# Patient Record
Sex: FEMALE | Race: WHITE | NOT HISPANIC OR LATINO
[De-identification: names, ages, dates, MRNs, and addresses within clinical notes are randomized per-mention and may not be internally consistent; named-entity substitution may affect disease eponyms.]

---

## 2022-07-07 ENCOUNTER — APPOINTMENT (OUTPATIENT)
Dept: FAMILY MEDICINE | Facility: CLINIC | Age: 22
End: 2022-07-07

## 2022-07-07 VITALS
OXYGEN SATURATION: 99 % | DIASTOLIC BLOOD PRESSURE: 69 MMHG | HEART RATE: 85 BPM | HEIGHT: 67 IN | SYSTOLIC BLOOD PRESSURE: 115 MMHG | RESPIRATION RATE: 16 BRPM | TEMPERATURE: 97.3 F

## 2022-07-07 VITALS — WEIGHT: 165 LBS | BODY MASS INDEX: 25.84 KG/M2

## 2022-07-07 DIAGNOSIS — Z23 ENCOUNTER FOR IMMUNIZATION: ICD-10-CM

## 2022-07-07 DIAGNOSIS — F17.290 NICOTINE DEPENDENCE, OTHER TOBACCO PRODUCT, UNCOMPLICATED: ICD-10-CM

## 2022-07-07 DIAGNOSIS — Z83.3 FAMILY HISTORY OF DIABETES MELLITUS: ICD-10-CM

## 2022-07-07 DIAGNOSIS — Z72.89 OTHER PROBLEMS RELATED TO LIFESTYLE: ICD-10-CM

## 2022-07-07 DIAGNOSIS — Z00.00 ENCOUNTER FOR GENERAL ADULT MEDICAL EXAMINATION W/OUT ABNORMAL FINDINGS: ICD-10-CM

## 2022-07-07 DIAGNOSIS — Z33.2 ENCOUNTER FOR ELECTIVE TERMINATION OF PREGNANCY: ICD-10-CM

## 2022-07-07 DIAGNOSIS — Z84.2 FAMILY HISTORY OF OTHER DISEASES OF THE GENITOURINARY SYSTEM: ICD-10-CM

## 2022-07-07 DIAGNOSIS — B17.8 INFECTIOUS MONONUCLEOSIS, UNSPECIFIED WITH OTHER COMPLICATION: ICD-10-CM

## 2022-07-07 DIAGNOSIS — Z82.0 FAMILY HISTORY OF EPILEPSY AND OTHER DISEASES OF THE NERVOUS SYSTEM: ICD-10-CM

## 2022-07-07 DIAGNOSIS — Z82.49 FAMILY HISTORY OF ISCHEMIC HEART DISEASE AND OTHER DISEASES OF THE CIRCULATORY SYSTEM: ICD-10-CM

## 2022-07-07 DIAGNOSIS — Z11.3 ENCOUNTER FOR SCREENING FOR INFECTIONS WITH A PREDOMINANTLY SEXUAL MODE OF TRANSMISSION: ICD-10-CM

## 2022-07-07 DIAGNOSIS — Z84.89 FAMILY HISTORY OF OTHER SPECIFIED CONDITIONS: ICD-10-CM

## 2022-07-07 DIAGNOSIS — B27.99 INFECTIOUS MONONUCLEOSIS, UNSPECIFIED WITH OTHER COMPLICATION: ICD-10-CM

## 2022-07-07 PROCEDURE — 90715 TDAP VACCINE 7 YRS/> IM: CPT

## 2022-07-07 PROCEDURE — 99385 PREV VISIT NEW AGE 18-39: CPT | Mod: 25

## 2022-07-07 PROCEDURE — 90471 IMMUNIZATION ADMIN: CPT

## 2022-07-07 RX ORDER — ONDANSETRON 4 MG/1
4 TABLET ORAL
Qty: 8 | Refills: 0 | Status: DISCONTINUED | COMMUNITY
Start: 2022-05-17

## 2022-07-07 RX ORDER — IBUPROFEN 800 MG/1
800 TABLET, FILM COATED ORAL
Qty: 15 | Refills: 0 | Status: DISCONTINUED | COMMUNITY
Start: 2022-05-17

## 2022-07-07 NOTE — ASSESSMENT
[FreeTextEntry1] : Blood work requisition provided. Will follow up with results.\par Tdap administered today\par Declines COVID vaccination \par LS Spine XR \par Return for cervical cancer screening, Discuss gardasil at that time, records have no indication that it has been administered.

## 2022-07-07 NOTE — PHYSICAL EXAM
[No Joint Swelling] : no joint swelling [Grossly Normal Strength/Tone] : grossly normal strength/tone [Coordination Grossly Intact] : coordination grossly intact [No Focal Deficits] : no focal deficits [Normal] : affect was normal and insight and judgment were intact

## 2022-07-07 NOTE — HEALTH RISK ASSESSMENT
[Current] : Current [None] : None [Good] : ~his/her~  mood as  good [2 - 4 times a month (2 pts)] : 2-4 times a month (2 points) [1 or 2 (0 pts)] : 1 or 2 (0 points) [Monthly (2 pts)] : Monthly (2 points) [Yes] : In the past 12 months have you used drugs other than those required for medical reasons? Yes [0] : 2) Feeling down, depressed, or hopeless: Not at all (0) [PHQ-2 Negative - No further assessment needed] : PHQ-2 Negative - No further assessment needed [HIV Test offered] : HIV Test offered [Hepatitis C test offered] : Hepatitis C test offered [With Family] : lives with family [Employed] : employed [Single] : single [Sexually Active] : sexually active [High Risk Behavior] : high risk behavior [Fully functional (bathing, dressing, toileting, transferring, walking, feeding)] : Fully functional (bathing, dressing, toileting, transferring, walking, feeding) [Fully functional (using the telephone, shopping, preparing meals, housekeeping, doing laundry, using] : Fully functional and needs no help or supervision to perform IADLs (using the telephone, shopping, preparing meals, housekeeping, doing laundry, using transportation, managing medications and managing finances) [de-identified] : uses Juul vape [Audit-CScore] : 4 [de-identified] : marijuana [[id number]] : 0 [Reports changes in hearing] : Reports no changes in hearing [Reports changes in vision] : Reports no changes in vision [Reports normal functional visual acuity (ie: able to read med bottle)] : Reports poor functional visual acuity.  [de-identified] : wears contacts

## 2022-07-07 NOTE — COUNSELING
[Yes] : Risk of tobacco use and health benefits of smoking cessation discussed: Yes [Encouraged to pick a quit date and identify support needed to quit] : Encouraged to pick a quit date and identify support needed to quit [AUDIT-C Screening administered and reviewed] : AUDIT-C Screening administered and reviewed [Hazards of at-risk alcohol use discussed] : Hazards of at-risk alcohol use discussed [FreeTextEntry2] : Never drives while intoxicated  [Good understanding] : Patient has a good understanding of disease, goals and obesity follow-up plan

## 2022-07-07 NOTE — REVIEW OF SYSTEMS
[Vision Problems] : vision problems [Back Pain] : back pain [Negative] : Psychiatric [FreeTextEntry3] : contact lenses [de-identified] : headaches with periods

## 2023-01-13 ENCOUNTER — APPOINTMENT (OUTPATIENT)
Dept: FAMILY MEDICINE | Facility: CLINIC | Age: 23
End: 2023-01-13
Payer: COMMERCIAL

## 2023-01-13 VITALS
TEMPERATURE: 98.6 F | OXYGEN SATURATION: 99 % | WEIGHT: 159 LBS | DIASTOLIC BLOOD PRESSURE: 79 MMHG | HEART RATE: 87 BPM | SYSTOLIC BLOOD PRESSURE: 125 MMHG | RESPIRATION RATE: 16 BRPM

## 2023-01-13 DIAGNOSIS — F41.1 GENERALIZED ANXIETY DISORDER: ICD-10-CM

## 2023-01-13 PROCEDURE — 99214 OFFICE O/P EST MOD 30 MIN: CPT | Mod: 25

## 2023-01-13 PROCEDURE — 81003 URINALYSIS AUTO W/O SCOPE: CPT | Mod: NC,QW

## 2023-01-13 RX ORDER — NORETHINDRONE ACETATE AND ETHINYL ESTRADIOL 1MG-20(21)
1-20 KIT ORAL
Qty: 28 | Refills: 0 | Status: DISCONTINUED | COMMUNITY
Start: 2022-05-31 | End: 2023-01-13

## 2023-01-13 NOTE — HISTORY OF PRESENT ILLNESS
[FreeTextEntry8] : 21 yo female with history of mononucleosis hepatitis (2019) presents with concerns of pain with urination. She started feeling symptoms about one week ago. Has had intermittent burning and pelvic pressure. She was taking an OTC medication but took pills sporadically. Yesterday she felt like she constantly had to urinate. Denies fever, chills, nausea, vomiting. She is currently at the end of her period, LMP 1/5/2023. Currently sexually active with one male partner with consistent condom use. Did not start birth control pills.\par No recent antibiotic use, no allergies to meds. Has some cramping and back pain like she usually has with periods. \par \par Mildred also notes ongoing anxiety that she has been experiencing primarily since starting work. She has anxiety attacks in the mornings before going to work. She worries about getting to work on time and driving. Sometimes this anxiety will lead to wretching, but noting come out. She tries to calm herself down by breathing. She does not experience these symptoms on the weekends. She would be interested in speaking with a therapist. She is not interested in medication at this time.

## 2023-01-13 NOTE — PHYSICAL EXAM
[Normal] : normal rate, regular rhythm, normal S1 and S2 and no murmur heard [de-identified] : supra pubic tenderness with palpation

## 2023-01-17 LAB
APPEARANCE: ABNORMAL
BACTERIA UR CULT: ABNORMAL
BACTERIA: NEGATIVE
BILIRUBIN URINE: NEGATIVE
BLOOD URINE: ABNORMAL
COLOR: YELLOW
GLUCOSE QUALITATIVE U: NEGATIVE
HYALINE CASTS: 1 /LPF
KETONES URINE: NEGATIVE
LEUKOCYTE ESTERASE URINE: ABNORMAL
MICROSCOPIC-UA: NORMAL
NITRITE URINE: POSITIVE
PH URINE: 6
PROTEIN URINE: ABNORMAL
RED BLOOD CELLS URINE: 12 /HPF
SPECIFIC GRAVITY URINE: 1.01
SQUAMOUS EPITHELIAL CELLS: 1 /HPF
UROBILINOGEN URINE: NORMAL
WHITE BLOOD CELLS URINE: 218 /HPF

## 2023-01-23 ENCOUNTER — NON-APPOINTMENT (OUTPATIENT)
Age: 23
End: 2023-01-23

## 2023-01-23 DIAGNOSIS — N30.00 ACUTE CYSTITIS W/OUT HEMATURIA: ICD-10-CM

## 2023-01-23 RX ORDER — NITROFURANTOIN (MONOHYDRATE/MACROCRYSTALS) 25; 75 MG/1; MG/1
100 CAPSULE ORAL
Qty: 10 | Refills: 0 | Status: DISCONTINUED | COMMUNITY
Start: 2023-01-13 | End: 2023-01-23

## 2023-02-23 ENCOUNTER — APPOINTMENT (OUTPATIENT)
Dept: FAMILY MEDICINE | Facility: CLINIC | Age: 23
End: 2023-02-23
Payer: COMMERCIAL

## 2023-02-23 DIAGNOSIS — R30.0 DYSURIA: ICD-10-CM

## 2023-02-23 PROCEDURE — 99441: CPT

## 2023-02-23 RX ORDER — SULFAMETHOXAZOLE AND TRIMETHOPRIM 800; 160 MG/1; MG/1
800-160 TABLET ORAL
Qty: 10 | Refills: 0 | Status: COMPLETED | COMMUNITY
Start: 2023-01-23 | End: 2023-02-28

## 2023-02-23 RX ORDER — PHENAZOPYRIDINE HYDROCHLORIDE 100 MG/1
100 TABLET ORAL TWICE DAILY
Qty: 10 | Refills: 3 | Status: ACTIVE | COMMUNITY
Start: 2023-02-23 | End: 1900-01-01

## 2023-02-24 ENCOUNTER — NON-APPOINTMENT (OUTPATIENT)
Age: 23
End: 2023-02-24

## 2023-02-24 LAB
C TRACH RRNA SPEC QL NAA+PROBE: NOT DETECTED
N GONORRHOEA RRNA SPEC QL NAA+PROBE: NOT DETECTED
SOURCE AMPLIFICATION: NORMAL

## 2023-02-27 ENCOUNTER — NON-APPOINTMENT (OUTPATIENT)
Age: 23
End: 2023-02-27

## 2023-02-27 LAB
APPEARANCE: ABNORMAL
BACTERIA UR CULT: ABNORMAL
BACTERIA: ABNORMAL
BILIRUBIN URINE: ABNORMAL
BLOOD URINE: ABNORMAL
COLOR: ABNORMAL
GLUCOSE QUALITATIVE U: ABNORMAL
GRANULAR CASTS: 0 /LPF
HYALINE CASTS: 0 /LPF
KETONES URINE: ABNORMAL
LEUKOCYTE ESTERASE URINE: ABNORMAL
MICROSCOPIC-UA: NORMAL
NITRITE URINE: ABNORMAL
PH URINE: ABNORMAL
PROTEIN URINE: ABNORMAL
RED BLOOD CELLS URINE: 25 /HPF
SPECIFIC GRAVITY URINE: ABNORMAL
SQUAMOUS EPITHELIAL CELLS: 0 /HPF
UROBILINOGEN URINE: ABNORMAL
WHITE BLOOD CELLS URINE: 200 /HPF

## 2023-04-25 ENCOUNTER — NON-APPOINTMENT (OUTPATIENT)
Age: 23
End: 2023-04-25

## 2023-06-02 DIAGNOSIS — M54.42 LUMBAGO WITH SCIATICA, LEFT SIDE: ICD-10-CM

## 2023-06-02 DIAGNOSIS — G89.29 LUMBAGO WITH SCIATICA, LEFT SIDE: ICD-10-CM

## 2023-11-02 ENCOUNTER — APPOINTMENT (OUTPATIENT)
Dept: FAMILY MEDICINE | Facility: CLINIC | Age: 23
End: 2023-11-02

## 2024-05-09 ENCOUNTER — APPOINTMENT (OUTPATIENT)
Dept: FAMILY MEDICINE | Facility: CLINIC | Age: 24
End: 2024-05-09

## 2024-08-12 ENCOUNTER — APPOINTMENT (OUTPATIENT)
Dept: FAMILY MEDICINE | Facility: CLINIC | Age: 24
End: 2024-08-12
Payer: COMMERCIAL

## 2024-08-12 DIAGNOSIS — K14.8 OTHER DISEASES OF TONGUE: ICD-10-CM

## 2024-08-12 PROCEDURE — G2211 COMPLEX E/M VISIT ADD ON: CPT | Mod: NC

## 2024-08-12 PROCEDURE — 99213 OFFICE O/P EST LOW 20 MIN: CPT

## 2024-08-12 NOTE — PHYSICAL EXAM
[Normal Oropharynx] : the oropharynx was normal [No Respiratory Distress] : no respiratory distress  [Coordination Grossly Intact] : coordination grossly intact [No Focal Deficits] : no focal deficits [Normal] : affect was normal and insight and judgment were intact [de-identified] : right lateral tongue with white plaques, able to be scraped

## 2024-08-12 NOTE — HISTORY OF PRESENT ILLNESS
[FreeTextEntry8] : 23yo female with history of mononucleosis hepatitis (2019) presents with concern for tongue lesion. She has been experiencing an issue with her tongue for years since her time in college. The condition is characterized by a redness all over her tongue which she tends to bite and scratch causing bleeding at times. The problem comes and goes. She has a history of smoking, specifically vaping but is unsure if it's related to her condition. Currently, she's experiencing another bout of this condition leading her to seek medical help. She has not previously consulted a dentist about the condition but reports to be in overall good health. She tried to use mouthwash to alleviate her symptoms, but without success. She is in a relationship with a male partner who is asymptomatic.

## 2024-09-12 ENCOUNTER — APPOINTMENT (OUTPATIENT)
Dept: FAMILY MEDICINE | Facility: CLINIC | Age: 24
End: 2024-09-12

## 2024-12-25 PROBLEM — F10.90 ALCOHOL USE: Status: ACTIVE | Noted: 2022-07-07

## 2025-06-07 ENCOUNTER — NON-APPOINTMENT (OUTPATIENT)
Age: 25
End: 2025-06-07